# Patient Record
Sex: FEMALE | Race: WHITE | URBAN - NONMETROPOLITAN AREA
[De-identification: names, ages, dates, MRNs, and addresses within clinical notes are randomized per-mention and may not be internally consistent; named-entity substitution may affect disease eponyms.]

---

## 2022-02-23 ENCOUNTER — WEB ENCOUNTER (OUTPATIENT)
Dept: URBAN - NONMETROPOLITAN AREA CLINIC 13 | Facility: CLINIC | Age: 62
End: 2022-02-23

## 2022-02-23 ENCOUNTER — CLAIMS CREATED FROM THE CLAIM WINDOW (OUTPATIENT)
Dept: URBAN - NONMETROPOLITAN AREA CLINIC 13 | Facility: CLINIC | Age: 62
End: 2022-02-23
Payer: COMMERCIAL

## 2022-02-23 ENCOUNTER — LAB OUTSIDE AN ENCOUNTER (OUTPATIENT)
Dept: URBAN - NONMETROPOLITAN AREA CLINIC 13 | Facility: CLINIC | Age: 62
End: 2022-02-23

## 2022-02-23 ENCOUNTER — DASHBOARD ENCOUNTERS (OUTPATIENT)
Age: 62
End: 2022-02-23

## 2022-02-23 DIAGNOSIS — R10.9 RIGHT SIDED ABDOMINAL PAIN: ICD-10-CM

## 2022-02-23 DIAGNOSIS — Z12.11 COLON CANCER SCREENING: ICD-10-CM

## 2022-02-23 DIAGNOSIS — K52.9 CHRONIC DIARRHEA: ICD-10-CM

## 2022-02-23 PROCEDURE — 99204 OFFICE O/P NEW MOD 45 MIN: CPT | Performed by: NURSE PRACTITIONER

## 2022-02-23 RX ORDER — COLESTIPOL HYDROCHLORIDE 1 G/1
2 TABLETS TABLET, FILM COATED ORAL ONCE A DAY
OUTPATIENT

## 2022-02-23 RX ORDER — OMEPRAZOLE 40 MG/1
1 CAPSULE 30 MINUTES BEFORE MORNING MEAL CAPSULE, DELAYED RELEASE ORAL ONCE A DAY
Status: ACTIVE | COMMUNITY

## 2022-02-23 RX ORDER — SACCHAROMYCES BOULARDII 50 MG
1 CAPSULE CAPSULE ORAL TWICE A DAY
Qty: 60 | OUTPATIENT
Start: 2022-02-23 | End: 2022-03-25

## 2022-02-23 RX ORDER — DICYCLOMINE HYDROCHLORIDE 10 MG/1
1 CAPSULE BEFORE MEALS PRN ABDOMINAL PAIN CAPSULE ORAL THREE TIMES A DAY
Qty: 90 CAPSULE | Refills: 3 | OUTPATIENT
Start: 2022-02-23 | End: 2022-06-23

## 2022-02-23 RX ORDER — COLESTIPOL HYDROCHLORIDE 1 G/1
2 TABLETS TABLET, FILM COATED ORAL ONCE A DAY
Status: ACTIVE | COMMUNITY

## 2022-02-23 NOTE — HPI-TODAY'S VISIT:
2/23/2022 Ms. Halima Macias is a 61-year-old female with hx of breast cancer, HTN, diverticulosis, diverticulitis, and GERD referred by Dr. Adalberto Rush for chronic diarrhea.  A copy of this consultation will be forwarded to the referring provider. She had onset of diarrhea in October.  She saw Dr. Mishra and was prescribed Cipro with some improvement in her diarrhea however this quickly returned.  She saw him again in November and was treated with Cipro and Flagyl as she was also having some right-sided abdominal pain at that time.  Her abdominal pain improved however she continued with diarrhea.  She had follow-up stool studies that were negative and normal CBC, CMP, TSH.  H. pylori stool test was also negative.  She is now taking Colestid twice daily and is having 4-6 soft stools daily.  Occasionally she does have significant urgency.  She is not waking up at night for a bowel movement.  No rectal bleeding.  She does continue with persistent right-sided abdominal pain from the right upper quadrant extending down to the right lower quadrant and has tenderness on exam today.  She has not had abdominal imaging.  She last had colonoscopy by Dr. Pickens about 5 or 6 years ago that was normal and was told to repeat in 10 years. She does have reflux that is well controlled on omeprazole once daily.  She has not tried stopping this since her symptoms started but reports she has been on it for several years. She has tried dietary changes including dairy free, caffeine free, and sugar-free with no improvement in her symptoms.  TG

## 2022-04-11 PROBLEM — 428283002 HISTORY OF POLYP OF COLON: Status: ACTIVE | Noted: 2022-04-11

## 2022-04-15 ENCOUNTER — TELEPHONE ENCOUNTER (OUTPATIENT)
Dept: URBAN - NONMETROPOLITAN AREA CLINIC 13 | Facility: CLINIC | Age: 62
End: 2022-04-15

## 2022-04-20 ENCOUNTER — OFFICE VISIT (OUTPATIENT)
Dept: URBAN - NONMETROPOLITAN AREA SURGERY CENTER 1 | Facility: SURGERY CENTER | Age: 62
End: 2022-04-20
Payer: COMMERCIAL

## 2022-04-20 DIAGNOSIS — R19.7 ACUTE DIARRHEA: ICD-10-CM

## 2022-04-20 PROCEDURE — 45380 COLONOSCOPY AND BIOPSY: CPT | Performed by: INTERNAL MEDICINE

## 2022-04-20 PROCEDURE — G8907 PT DOC NO EVENTS ON DISCHARG: HCPCS | Performed by: INTERNAL MEDICINE

## 2022-04-22 LAB
ENDOMYSIAL ANTIBODY IGA: NEGATIVE
IMMUNOGLOBULIN A, QN, SERUM: 196
T-TRANSGLUTAMINASE (TTG) IGA: <2

## 2022-05-11 ENCOUNTER — OFFICE VISIT (OUTPATIENT)
Dept: URBAN - NONMETROPOLITAN AREA CLINIC 13 | Facility: CLINIC | Age: 62
End: 2022-05-11

## 2022-06-01 ENCOUNTER — TELEPHONE ENCOUNTER (OUTPATIENT)
Dept: URBAN - METROPOLITAN AREA CLINIC 23 | Facility: CLINIC | Age: 62
End: 2022-06-01